# Patient Record
Sex: FEMALE | Race: BLACK OR AFRICAN AMERICAN | NOT HISPANIC OR LATINO | URBAN - METROPOLITAN AREA
[De-identification: names, ages, dates, MRNs, and addresses within clinical notes are randomized per-mention and may not be internally consistent; named-entity substitution may affect disease eponyms.]

---

## 2024-11-06 ENCOUNTER — EMERGENCY (EMERGENCY)
Facility: HOSPITAL | Age: 49
LOS: 0 days | Discharge: HOME | End: 2024-11-06
Attending: STUDENT IN AN ORGANIZED HEALTH CARE EDUCATION/TRAINING PROGRAM

## 2024-11-06 VITALS
SYSTOLIC BLOOD PRESSURE: 110 MMHG | TEMPERATURE: 98 F | HEART RATE: 70 BPM | OXYGEN SATURATION: 98 % | DIASTOLIC BLOOD PRESSURE: 72 MMHG | RESPIRATION RATE: 18 BRPM | WEIGHT: 156.09 LBS | HEIGHT: 68 IN

## 2024-11-06 DIAGNOSIS — H00.019 HORDEOLUM EXTERNUM UNSPECIFIED EYE, UNSPECIFIED EYELID: ICD-10-CM

## 2024-11-06 DIAGNOSIS — Z98.89 OTHER SPECIFIED POSTPROCEDURAL STATES: Chronic | ICD-10-CM

## 2024-11-06 DIAGNOSIS — Z98.82 BREAST IMPLANT STATUS: Chronic | ICD-10-CM

## 2024-11-06 PROCEDURE — 99283 EMERGENCY DEPT VISIT LOW MDM: CPT

## 2024-11-06 NOTE — ED PROVIDER NOTE - CLINICAL SUMMARY MEDICAL DECISION MAKING FREE TEXT BOX
- Summary : The patient reported experiencing discomfort with their eye after accidentally scratching it. Pus and blood got into the eye, causing further discomfort.  - Chief Complaint (CC) : The patient is suffering from an eye infection, identified as a stye.  - History of Present Illness (HPI) : The patient's stye reportedly opened up after a nap, during which the patient was startled. Following this, pus began to ooze. The patient has not previously had a stye and has discontinued the use of warm compresses, previously administered, due to discomfort.  - Past Medical History :  - Summary : The patient's condition has been diagnosed as a stye, which is an infection in the eyelid causing discomfort to the patient.  - Differential Diagnosis :  - Conjunctivitis    - Corneal abrasion    - Blepharitis    Plan:  - Summary : To alleviate the patient's discomfort and treat the stye, I have planned to treat it using antibiotic ointment, called erythromycin. Furthermore, the patient is to continue using warm compresses as often as possible. Should symptoms persist, a recommendation to consult with an ophthalmologist has been made.  - Plan :  - Administer erythromycin    - Continue the use of warm compresses    - Refer to ophthalmologist if no improvement

## 2024-11-06 NOTE — ED ADULT NURSE NOTE - NSICDXFAMILYHX_GEN_ALL_CORE_FT
FAMILY HISTORY:  Father  Still living? Yes, Estimated age: Age Unknown  Family history of hypertension, Age at diagnosis: Age Unknown    Mother  Still living? No  Family history of breast cancer in mother, Age at diagnosis: Age Unknown

## 2024-11-06 NOTE — ED PROVIDER NOTE - PHYSICAL EXAMINATION
VITAL SIGNS: I have reviewed nursing notes and confirm.  CONSTITUTIONAL: well-appearing, non-toxic, NAD  SKIN: Warm dry, normal skin turgor  HEAD: NCAT  EYES: EOMI, PERRLA, no scleral icterus R upper eyelid stye   NEURO: normal motor. normal sensory. l. Normal gait.  PSYCH: Cooperative, appropriate.

## 2024-11-06 NOTE — ED ADULT TRIAGE NOTE - CHIEF COMPLAINT QUOTE
Patient reports Went to urgent care on Friday and said it was a sty. Now the eye is swollen. I accident hit in my sleep and pus and blood came out and got into my right eye." Upper right eye lid swollen, discolored, and fluctuant. Pain 7/10. Patient reports "blurry vison."  PMH: Denies

## 2024-11-06 NOTE — ED ADULT NURSE NOTE - OBJECTIVE STATEMENT
Assisting primary RN avery. 49 y.o female, A&ox4, c/o eye pain. pt noted to have sty on right upper eyelid. pt states she went to urgent care on Friday, and states she was told to come back to urgent care in anything worsened. pt states she accidently hit her eye in her sleep and pus and blood came out and got into her right eye. Upper right eye lid swollen, discolored, and fluctuant. Pain 7/10. Patient reports "blurry vison.". Dr. Duff aware.  no acute distress noted. respirations even and unlabored.   PMH: Denies

## 2024-11-06 NOTE — ED PROVIDER NOTE - PATIENT PORTAL LINK FT
You can access the FollowMyHealth Patient Portal offered by [facility] by registering at the following website: [email or web address]. By joining [employer]’s FollowMyHealth portal, you will also be able to view your health information using other applications (apps) compatible with our system.

## 2024-11-06 NOTE — ED ADULT NURSE NOTE - NSICDXPASTSURGICALHX_GEN_ALL_CORE_FT
PAST SURGICAL HISTORY:  H/O bilateral breast implants silicone- 2008    H/O left breast biopsy 1993

## 2025-02-17 ENCOUNTER — EMERGENCY (EMERGENCY)
Facility: HOSPITAL | Age: 50
LOS: 0 days | Discharge: HOME | End: 2025-02-18
Attending: STUDENT IN AN ORGANIZED HEALTH CARE EDUCATION/TRAINING PROGRAM
Payer: MEDICAID

## 2025-02-17 VITALS
HEART RATE: 92 BPM | WEIGHT: 154.1 LBS | DIASTOLIC BLOOD PRESSURE: 74 MMHG | HEIGHT: 68 IN | TEMPERATURE: 99 F | OXYGEN SATURATION: 99 % | SYSTOLIC BLOOD PRESSURE: 110 MMHG | RESPIRATION RATE: 18 BRPM

## 2025-02-17 DIAGNOSIS — Z98.89 OTHER SPECIFIED POSTPROCEDURAL STATES: Chronic | ICD-10-CM
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
## 2025-02-17 DIAGNOSIS — Z91.018 ALLERGY TO OTHER FOODS: ICD-10-CM

## 2025-02-17 DIAGNOSIS — S09.90XA UNSPECIFIED INJURY OF HEAD, INITIAL ENCOUNTER: ICD-10-CM
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
## 2025-02-17 DIAGNOSIS — K90.41 NON-CELIAC GLUTEN SENSITIVITY: ICD-10-CM

## 2025-02-17 DIAGNOSIS — M54.2 CERVICALGIA: ICD-10-CM

## 2025-02-17 DIAGNOSIS — Z98.82 BREAST IMPLANT STATUS: Chronic | ICD-10-CM

## 2025-02-17 PROCEDURE — 99284 EMERGENCY DEPT VISIT MOD MDM: CPT

## 2025-02-17 PROCEDURE — 70450 CT HEAD/BRAIN W/O DYE: CPT | Mod: 26

## 2025-02-17 RX ORDER — ACETAMINOPHEN 160 MG/5ML
975 SUSPENSION ORAL ONCE
Refills: 0 | Status: COMPLETED | OUTPATIENT
Start: 2025-02-17 | End: 2025-02-17

## 2025-02-17 RX ADMIN — ACETAMINOPHEN 975 MILLIGRAM(S): 160 SUSPENSION ORAL at 22:17

## 2025-02-17 NOTE — ED ADULT NURSE NOTE - OBJECTIVE STATEMENT
Patient AOX4 reports "I was in a shop and a bunch of stuff fell on my head and was knocked to the fall yesterday." + headache  "throbbing". + LOC: "about a few seconds."  "a little neck pain." Patient reports "while driving I was having a little trouble focusing." Denies nausea or vomiting.  Denies Anticoagulant use. Pain 6/10.  PMH: Breast Augmentation Pt in NAD at this time. Pt noted with even, unlabored respirations. Pt denies any chest pain or SOB at this time.

## 2025-02-17 NOTE — ED PROVIDER NOTE - PHYSICAL EXAMINATION
Gen: aox3, nad,   Head: NCAT  ENT: Airway patent, moist mucous membranes, nasal passageways clear, no pharyngeal erythema or exudates, uvula midline, no cervical lymphadenopathy, EOMI , eyes PERRL 4 mm b/l, TMs clear b/l, no hemotympanum   Cardiac: Normal rate  Respiratory: normal respirations, normal work of breathing   Gastrointestinal: Abdomen nondistended, central adiposity absent  MSK: No gross abnormalities, FROM of all four extremities, no edema, no midline c- spine tenderness, normal ROM c spine, mild ttp paraspinal low cervical region over trapezius muscles b/l   HEME: Extremities warm and well perfused   Skin: No rashes, no lesions  Neuro: No gross neurologic deficits, CN II-XII intact, no facial asymmetry, no dysarthria,  no sensory deficits, strength equal in all four extremities, no gait abnormality

## 2025-02-17 NOTE — ED PROVIDER NOTE - PATIENT PORTAL LINK FT
You can access the FollowMyHealth Patient Portal offered by [facility] by registering at the following website: [email or web address]. By joining [employer]’s FollowMyHealth portal, you will also be able to view your health information using other applications (apps) compatible with our system.

## 2025-02-17 NOTE — ED ADULT TRIAGE NOTE - CHIEF COMPLAINT QUOTE
Patient reports "I was in a shop and a bunch of stuff fell on my head and was knocked to the fall yesterday." + headache  "throbbing". + LOC: "about a few seconds."  "a little neck pain." Patient reports "while driving I was having a little trouble focusing." Denies nausea or vomiting.  Denies Anticoagulant use. Pain 6/10.  PMH: Breast Augmentation

## 2025-02-17 NOTE — ED PROVIDER NOTE - NSFOLLOWUPINSTRUCTIONS_ED_ALL_ED_FT
You were seen today for head injury - your head CT was normal     Follow up with primary care doctor   See attached concussion precautions     Return for any new or worsening symptoms    Headache    A headache is pain or discomfort felt around the head or neck area. The specific cause of a headache may not be found as there are many types including tension headaches, migraine headaches, and cluster headaches. Watch your condition for any changes. Things you can do to manage your pain include taking over the counter and prescription medications as instructed by your health care provider, lying down in a dark quiet room, limiting stress, getting regular sleep, and refraining from alcohol and tobacco products.    SEEK IMMEDIATE MEDICAL CARE IF YOU HAVE ANY OF THE FOLLOWING SYMPTOMS: fever, vomiting, stiff neck, loss of vision, problems with speech, muscle weakness, loss of balance, trouble walking, passing out, or confusion.

## 2025-02-17 NOTE — ED ADULT NURSE NOTE - NS ED NURSE RECORD ANOTHER HT AND WT
Patient called and Vit D was sent to wrong pharmacy.  Called [facility] and cancelled rx and sent to [facility]

## 2025-02-17 NOTE — ED PROVIDER NOTE - CLINICAL SUMMARY MEDICAL DECISION MAKING FREE TEXT BOX
49F  no pertinent pmhx who presents for eval of dizziness after head injury yday when metallic object fell off a shelf and hit the top of her head. Denies LOC but reports mild memory loss as to the incident or how it exactly happened. States has felt slight dizziness and tension like band like headache since this morning. Denies numbness, weakness, vision changes, or nausea/vomiting. Reports mild neck pain. Denies radicular pain   physical exam as above  plan - low risk for clinically significant TBI by [place]ino head ct score, however shared decision making and pt would like to proceed with CT head despite radiation exposure. pt concerned due to recurrent dizziness and persistent headache - will  CT, if wnl then will dc with concussion precautions

## 2025-02-18 ENCOUNTER — EMERGENCY (EMERGENCY)
Facility: HOSPITAL | Age: 50
LOS: 0 days | Discharge: HOME | End: 2025-02-18
Attending: EMERGENCY MEDICINE
Payer: MEDICAID

## 2025-02-18 VITALS
DIASTOLIC BLOOD PRESSURE: 69 MMHG | HEART RATE: 98 BPM | TEMPERATURE: 98 F | RESPIRATION RATE: 18 BRPM | HEIGHT: 68 IN | WEIGHT: 154.1 LBS | OXYGEN SATURATION: 100 % | SYSTOLIC BLOOD PRESSURE: 102 MMHG

## 2025-02-18 VITALS
RESPIRATION RATE: 18 BRPM | TEMPERATURE: 98 F | DIASTOLIC BLOOD PRESSURE: 67 MMHG | SYSTOLIC BLOOD PRESSURE: 100 MMHG | OXYGEN SATURATION: 100 % | HEART RATE: 96 BPM

## 2025-02-18 VITALS
SYSTOLIC BLOOD PRESSURE: 102 MMHG | DIASTOLIC BLOOD PRESSURE: 66 MMHG | TEMPERATURE: 98 F | OXYGEN SATURATION: 100 % | RESPIRATION RATE: 17 BRPM | HEART RATE: 88 BPM

## 2025-02-18 DIAGNOSIS — Z98.89 OTHER SPECIFIED POSTPROCEDURAL STATES: Chronic | ICD-10-CM

## 2025-02-18 PROCEDURE — 99283 EMERGENCY DEPT VISIT LOW MDM: CPT

## 2025-02-18 NOTE — ED PROVIDER NOTE - CARE PROVIDER_API CALL
Tristian Ortiz)  Neurology  [address], [address]  [place] [address]  Phone: [phone]  Fax: [phone]  Follow Up Time: 4-6 Days

## 2025-02-18 NOTE — ED PROVIDER NOTE - ENMT, MLM
Airway patent, Nasal mucosa clear. Mouth with normal mucosa. Throat has no vesicles, no oropharyngeal exudates and uvula is midline. TM clear bilaterally, no cerumen bilaterally no otic foreign bodies bilaterally.

## 2025-02-18 NOTE — ED ADULT NURSE NOTE - OBJECTIVE STATEMENT
pt was here yesterday was told had a concussion  , hit her Sunday, pt c/o today right pitch ringing right ear  since 6 pm , getting better now denies dizziness, c/o headache and neck pain since Sunday. Pt ambulatory w/steady gait. Pt denies cp, difficulty breathing, blurry vision, numbness or tingling, n/v/d, fever or chills at this time.

## 2025-02-18 NOTE — ED PROVIDER NOTE - NSFOLLOWUPCLINICS_GEN_ALL_ED_FT
Concussion Program  Concussion  .  [place]   Phone: [phone]  Fax:     [facility]  Neurology  [address] [place] [address]  Phone: [phone]  Fax: [phone]    [facility]  Neurology  [address] [place] [address], [address]  [place] [address]  Phone: [phone]  Fax:     [facility]  Neurology  [address] [place] [address]  Phone: [phone]  Fax:     [facility]  Neurology  [address] [place] [address]  Phone: [phone]  Fax: [phone]    [facility]  Concussion  [address] [place] [address]  Phone: [phone]  Fax:

## 2025-02-18 NOTE — ED ADULT TRIAGE NOTE - CHIEF COMPLAINT QUOTE
pt was here yesterday was told had a concussion  , hit her Sunday, pt c/o today right pitch ringing right ear  since 6 pm , getting better now denies dizziness, c/o headache and neck pain since Sunday

## 2025-02-18 NOTE — ED PROVIDER NOTE - PATIENT PORTAL LINK FT
You can access the FollowMyHealth Patient Portal offered by [facility] by registering at the following website: [email or web address]. By joining [email or web address]’s FollowMyHealth portal, you will also be able to view your health information using other applications (apps) compatible with our system.

## 2025-02-18 NOTE — ED PROVIDER NOTE - OBJECTIVE STATEMENT
The patient sustained blunt head trauma on 2/16 and was seen here at the [facility] ED yesterday and was diagnosed with a concussion and had a normal non-contrast head CT during that visit. She has been having mild headaches and today at about 6p she developed ringing in her right ear which gradually subsided. She hasn't put anything in that ear and has had no head trauma since 2/16.

## 2025-02-18 NOTE — ED PROVIDER NOTE - NSFOLLOWUPCLINICSTOKEN_GEN_ALL_ED_FT
[id number]: || ||00\01||False;[id number]: || ||00\01||False;[id number]: || ||00\01||False;[id number]: || ||00\01||False;[id number]: || ||00\01||False;[id number]: || ||00\01||False;

## 2025-02-19 DIAGNOSIS — H93.11 TINNITUS, RIGHT EAR: ICD-10-CM

## 2025-02-19 DIAGNOSIS — R51.9 HEADACHE, UNSPECIFIED: ICD-10-CM

## 2025-02-19 DIAGNOSIS — S06.0X0D CONCUSSION WITHOUT LOSS OF CONSCIOUSNESS, SUBSEQUENT ENCOUNTER: ICD-10-CM

## 2025-02-19 DIAGNOSIS — Z91.018 ALLERGY TO OTHER FOODS: ICD-10-CM

## 2025-02-19 DIAGNOSIS — H92.11 OTORRHEA, RIGHT EAR: ICD-10-CM

## 2025-03-07 PROBLEM — Z00.00 ENCOUNTER FOR PREVENTIVE HEALTH EXAMINATION: Status: ACTIVE | Noted: 2025-03-07

## 2025-03-10 ENCOUNTER — APPOINTMENT (OUTPATIENT)
Dept: PAIN MANAGEMENT | Facility: CLINIC | Age: 50
End: 2025-03-10

## 2025-03-12 ENCOUNTER — APPOINTMENT (OUTPATIENT)
Dept: NEUROLOGY | Facility: CLINIC | Age: 50
End: 2025-03-12
Payer: MEDICAID

## 2025-03-12 ENCOUNTER — NON-APPOINTMENT (OUTPATIENT)
Age: 50
End: 2025-03-12

## 2025-03-12 VITALS
BODY MASS INDEX: 23.34 KG/M2 | WEIGHT: 154 LBS | DIASTOLIC BLOOD PRESSURE: 79 MMHG | HEART RATE: 92 BPM | HEIGHT: 68 IN | SYSTOLIC BLOOD PRESSURE: 111 MMHG

## 2025-03-12 DIAGNOSIS — F07.81 POSTCONCUSSIONAL SYNDROME: ICD-10-CM

## 2025-03-12 DIAGNOSIS — R51.9 HEADACHE, UNSPECIFIED: ICD-10-CM

## 2025-03-12 DIAGNOSIS — R42 DIZZINESS AND GIDDINESS: ICD-10-CM

## 2025-03-12 DIAGNOSIS — M54.2 CERVICALGIA: ICD-10-CM

## 2025-03-12 PROCEDURE — 99205 OFFICE O/P NEW HI 60 MIN: CPT

## 2025-03-12 RX ORDER — NORTRIPTYLINE HYDROCHLORIDE 25 MG/1
25 CAPSULE ORAL
Qty: 30 | Refills: 1 | Status: ACTIVE | COMMUNITY
Start: 2025-03-12 | End: 1900-01-01

## 2025-03-12 RX ORDER — RIZATRIPTAN BENZOATE 5 MG/1
5 TABLET ORAL
Qty: 9 | Refills: 3 | Status: ACTIVE | COMMUNITY
Start: 2025-03-12 | End: 1900-01-01

## 2025-03-28 ENCOUNTER — APPOINTMENT (OUTPATIENT)
Age: 50
End: 2025-03-28

## 2025-03-31 ENCOUNTER — APPOINTMENT (OUTPATIENT)
Dept: PAIN MANAGEMENT | Facility: CLINIC | Age: 50
End: 2025-03-31

## 2025-05-08 ENCOUNTER — APPOINTMENT (OUTPATIENT)
Dept: NEUROLOGY | Facility: CLINIC | Age: 50
End: 2025-05-08
Payer: MEDICAID

## 2025-05-08 VITALS
SYSTOLIC BLOOD PRESSURE: 100 MMHG | HEIGHT: 68 IN | WEIGHT: 154 LBS | DIASTOLIC BLOOD PRESSURE: 64 MMHG | BODY MASS INDEX: 23.34 KG/M2 | HEART RATE: 93 BPM

## 2025-05-08 DIAGNOSIS — R20.2 PARESTHESIA OF SKIN: ICD-10-CM

## 2025-05-08 DIAGNOSIS — F07.81 POSTCONCUSSIONAL SYNDROME: ICD-10-CM

## 2025-05-08 DIAGNOSIS — H93.11 TINNITUS, RIGHT EAR: ICD-10-CM

## 2025-05-08 DIAGNOSIS — M54.2 CERVICALGIA: ICD-10-CM

## 2025-05-08 PROCEDURE — 99214 OFFICE O/P EST MOD 30 MIN: CPT

## 2025-05-08 RX ORDER — TIZANIDINE HYDROCHLORIDE 2 MG/1
2 CAPSULE ORAL
Qty: 30 | Refills: 0 | Status: ACTIVE | COMMUNITY
Start: 2025-05-08 | End: 1900-01-01

## 2025-05-08 RX ORDER — DOXYCYCLINE HYCLATE 50 MG/1
CAPSULE ORAL
Refills: 0 | Status: ACTIVE | COMMUNITY

## 2025-05-20 ENCOUNTER — APPOINTMENT (OUTPATIENT)
Dept: MRI IMAGING | Facility: CLINIC | Age: 50
End: 2025-05-20
Payer: MEDICAID

## 2025-05-20 ENCOUNTER — OUTPATIENT (OUTPATIENT)
Dept: OUTPATIENT SERVICES | Facility: HOSPITAL | Age: 50
LOS: 1 days | End: 2025-05-20
Payer: MEDICAID

## 2025-05-20 DIAGNOSIS — Z98.89 OTHER SPECIFIED POSTPROCEDURAL STATES: Chronic | ICD-10-CM

## 2025-05-20 DIAGNOSIS — R20.2 PARESTHESIA OF SKIN: ICD-10-CM

## 2025-05-20 DIAGNOSIS — Z98.82 BREAST IMPLANT STATUS: Chronic | ICD-10-CM

## 2025-05-20 DIAGNOSIS — M54.2 CERVICALGIA: ICD-10-CM

## 2025-05-20 PROCEDURE — 72141 MRI NECK SPINE W/O DYE: CPT | Mod: 26

## 2025-05-20 PROCEDURE — 72141 MRI NECK SPINE W/O DYE: CPT

## 2025-05-22 RX ORDER — METHOCARBAMOL 500 MG/1
500 TABLET, FILM COATED ORAL
Qty: 120 | Refills: 1 | Status: ACTIVE | COMMUNITY
Start: 2025-05-22 | End: 1900-01-01

## 2025-07-23 ENCOUNTER — RX RENEWAL (OUTPATIENT)
Age: 50
End: 2025-07-23

## 2025-07-24 ENCOUNTER — APPOINTMENT (OUTPATIENT)
Dept: NEUROLOGY | Facility: CLINIC | Age: 50
End: 2025-07-24
Payer: MEDICAID

## 2025-07-24 VITALS
HEART RATE: 92 BPM | BODY MASS INDEX: 23.34 KG/M2 | SYSTOLIC BLOOD PRESSURE: 106 MMHG | DIASTOLIC BLOOD PRESSURE: 66 MMHG | WEIGHT: 154 LBS | HEIGHT: 68 IN

## 2025-07-24 DIAGNOSIS — M54.2 CERVICALGIA: ICD-10-CM

## 2025-07-24 DIAGNOSIS — F07.81 POSTCONCUSSIONAL SYNDROME: ICD-10-CM

## 2025-07-24 DIAGNOSIS — H93.11 TINNITUS, RIGHT EAR: ICD-10-CM

## 2025-07-24 DIAGNOSIS — R51.9 HEADACHE, UNSPECIFIED: ICD-10-CM

## 2025-07-24 DIAGNOSIS — R42 DIZZINESS AND GIDDINESS: ICD-10-CM

## 2025-07-24 PROCEDURE — 99214 OFFICE O/P EST MOD 30 MIN: CPT
